# Patient Record
Sex: MALE | Race: WHITE | Employment: STUDENT | URBAN - METROPOLITAN AREA
[De-identification: names, ages, dates, MRNs, and addresses within clinical notes are randomized per-mention and may not be internally consistent; named-entity substitution may affect disease eponyms.]

---
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
## 2024-02-14 ENCOUNTER — HOSPITAL ENCOUNTER (OUTPATIENT)
Age: 19
Discharge: HOME | End: 2024-02-14
Payer: COMMERCIAL

## 2024-02-14 VITALS
OXYGEN SATURATION: 100 % | TEMPERATURE: 97 F | HEART RATE: 78 BPM | DIASTOLIC BLOOD PRESSURE: 69 MMHG | RESPIRATION RATE: 17 BRPM | SYSTOLIC BLOOD PRESSURE: 144 MMHG

## 2024-02-14 DIAGNOSIS — Z20.822 LAB TEST NEGATIVE FOR COVID-19 VIRUS: ICD-10-CM

## 2024-02-14 DIAGNOSIS — B34.9 VIRAL ILLNESS: Primary | ICD-10-CM

## 2024-02-14 LAB
POCT INFLUENZA A: NEGATIVE
POCT INFLUENZA B: NEGATIVE
S PYO AG THROAT QL: NEGATIVE
SARS-COV-2 RNA RESP QL NAA+PROBE: NOT DETECTED

## 2024-02-14 PROCEDURE — 99203 OFFICE O/P NEW LOW 30 MIN: CPT | Performed by: NURSE PRACTITIONER

## 2024-02-14 PROCEDURE — 87502 INFLUENZA DNA AMP PROBE: CPT | Performed by: NURSE PRACTITIONER

## 2024-02-14 PROCEDURE — U0002 COVID-19 LAB TEST NON-CDC: HCPCS | Performed by: NURSE PRACTITIONER

## 2024-02-14 PROCEDURE — 87880 STREP A ASSAY W/OPTIC: CPT | Performed by: NURSE PRACTITIONER

## 2024-02-15 NOTE — DISCHARGE INSTRUCTIONS
Recommend over-the-counter ibuprofen or Tylenol for pain or discomfort recommend over-the-counter Mucinex to help with any cough you may try over-the-counter Flonase and Zyrtec to help with runny nose or congestion drink plenty of fluids warm tea with honey.  You may try throat lozenges or over-the-counter follow-up with your primary care provider if symptoms persist or worsen.

## 2024-02-15 NOTE — ED PROVIDER NOTES
Patient Seen in: [facility]      History     Chief Complaint   Patient presents with    Cough/URI     Stated Complaint: sore throat sinus fatigue    Subjective:   HPI    This is a 18-year-old male presenting with sinus congestion and dry cough and sore throat for 3 days.  Patient states he has had his symptoms for about 3 days.  No fever vomiting diarrhea chest pain or shortness of breath.  No known sick contacts but goes to the gym so possible sick contacts there.    Objective:   No pertinent past medical history.            No pertinent past surgical history.              No pertinent social history.            Review of Systems    Positive for stated complaint: sore throat sinus fatigue  Other systems are as noted in HPI.  Constitutional and vital signs reviewed.      All other systems reviewed and negative except as noted above.    Physical Exam     ED Triage Vitals   BP 02/14/24 1743 144/69   Pulse 02/14/24 1743 78   Resp 02/14/24 1743 17   Temp 02/14/24 1747 97.2 °F (36.2 °C)   Temp src --    SpO2 02/14/24 1743 100 %   O2 Device 02/14/24 1743 None (Room air)       Current:/69   Pulse 78   Temp 97.2 °F (36.2 °C)   Resp 17   SpO2 100%         Physical Exam  Vitals and nursing note reviewed.   Constitutional:       Appearance: Normal appearance.   HENT:      Right Ear: Tympanic membrane normal.      Left Ear: Tympanic membrane normal.      Nose: Congestion present. No rhinorrhea.      Mouth/Throat:      Mouth: Mucous membranes are moist.      Pharynx: Oropharynx is clear. Posterior oropharyngeal erythema present.      Comments: No hot potato voice no trismus uvula midline no tonsillar swelling or exudates  Eyes:      Conjunctiva/sclera: Conjunctivae normal.   Cardiovascular:      Rate and Rhythm: Normal rate.   Pulmonary:      Effort: Pulmonary effort is normal. No respiratory distress.      Breath sounds: Normal breath sounds. No wheezing.   Musculoskeletal:         General: Normal range  of motion.      Cervical back: Normal range of motion. No rigidity.   Neurological:      Mental Status: He is alert and oriented to person, place, and time.               ED Course     Labs Reviewed   POCT RAPID STREP - Normal   POCT FLU TEST - Normal    Narrative:     This assay is a rapid molecular in vitro test utilizing nucleic acid amplification of influenza A and B viral RNA.   RAPID SARS-COV-2 BY PCR - Normal                      MDM             Medical Decision Making  18-year-old male nontoxic-appearing afebrile no hypoxia or distress with viral symptoms.  Concern for COVID versus flu versus viral or bacterial pharyngitis or another viral illness.  Clinically patient does not have a sinusitis so antibiotics will not be prescribed he will be swabbed for COVID flu and strep.      COVID flu strep negative patient made aware of results discussed likely a viral illness.  Discussed over-the-counter medications to help with symptoms outpatient follow-up.  All education instructions placed in discharge paperwork.  Patient acknowledged understanding instructions.        Problems Addressed:  Viral illness: acute illness or injury    Amount and/or Complexity of Data Reviewed  Labs:  Decision-making details documented in ED Course.    Risk  OTC drugs.        Disposition and Plan     Clinical Impression:  1. Viral illness    2. Lab test negative for COVID-19 virus         Disposition:  Discharge  2/14/2024  6:22 pm    Follow-up:  Elías Lugo MD  [address]  [address]  [facility] [place] [address] [phone]      If symptoms worsen          Medications Prescribed:  There are no discharge medications for this patient.